# Patient Record
Sex: FEMALE | Race: WHITE | Employment: FULL TIME | URBAN - NONMETROPOLITAN AREA
[De-identification: names, ages, dates, MRNs, and addresses within clinical notes are randomized per-mention and may not be internally consistent; named-entity substitution may affect disease eponyms.]

---

## 2018-09-13 ENCOUNTER — HOSPITAL ENCOUNTER (EMERGENCY)
Age: 28
Discharge: HOME | End: 2018-09-13
Payer: COMMERCIAL

## 2018-09-13 VITALS
WEIGHT: 293 LBS | HEART RATE: 92 BPM | RESPIRATION RATE: 18 BRPM | DIASTOLIC BLOOD PRESSURE: 74 MMHG | TEMPERATURE: 98.4 F | OXYGEN SATURATION: 100 % | SYSTOLIC BLOOD PRESSURE: 133 MMHG

## 2018-09-13 DIAGNOSIS — J02.0 ACUTE STREPTOCOCCAL PHARYNGITIS: Primary | ICD-10-CM

## 2018-09-13 DIAGNOSIS — J20.9 ACUTE BRONCHITIS WITH BRONCHOSPASM: ICD-10-CM

## 2018-09-13 LAB
GROUP A STREP CULTURE, REFLEX: POSITIVE
REFLEX THROAT C + S: NORMAL

## 2018-09-13 PROCEDURE — 94640 AIRWAY INHALATION TREATMENT: CPT

## 2018-09-13 PROCEDURE — 2709999900 HC NON-CHARGEABLE SUPPLY

## 2018-09-13 PROCEDURE — 99203 OFFICE O/P NEW LOW 30 MIN: CPT | Performed by: NURSE PRACTITIONER

## 2018-09-13 PROCEDURE — 6370000000 HC RX 637 (ALT 250 FOR IP): Performed by: NURSE PRACTITIONER

## 2018-09-13 PROCEDURE — 99203 OFFICE O/P NEW LOW 30 MIN: CPT

## 2018-09-13 RX ORDER — IPRATROPIUM BROMIDE AND ALBUTEROL SULFATE 2.5; .5 MG/3ML; MG/3ML
1 SOLUTION RESPIRATORY (INHALATION) ONCE
Status: COMPLETED | OUTPATIENT
Start: 2018-09-13 | End: 2018-09-13

## 2018-09-13 RX ORDER — ALBUTEROL SULFATE 90 UG/1
2 AEROSOL, METERED RESPIRATORY (INHALATION) EVERY 4 HOURS PRN
Qty: 1 INHALER | Refills: 0 | Status: SHIPPED | OUTPATIENT
Start: 2018-09-13

## 2018-09-13 RX ORDER — DEXTROMETHORPHAN POLISTIREX 30 MG/5ML
60 SUSPENSION ORAL 2 TIMES DAILY PRN
Refills: 0 | COMMUNITY
Start: 2018-09-13 | End: 2018-09-23

## 2018-09-13 RX ORDER — AZITHROMYCIN 250 MG/1
TABLET, FILM COATED ORAL
Qty: 6 TABLET | Refills: 0 | Status: SHIPPED | OUTPATIENT
Start: 2018-09-13

## 2018-09-13 RX ORDER — PREDNISONE 20 MG/1
20 TABLET ORAL 2 TIMES DAILY
Qty: 10 TABLET | Refills: 0 | Status: SHIPPED | OUTPATIENT
Start: 2018-09-13 | End: 2018-09-18

## 2018-09-13 RX ADMIN — IPRATROPIUM BROMIDE AND ALBUTEROL SULFATE 1 AMPULE: .5; 3 SOLUTION RESPIRATORY (INHALATION) at 13:25

## 2018-09-13 ASSESSMENT — ENCOUNTER SYMPTOMS
RHINORRHEA: 1
ABDOMINAL PAIN: 0
SINUS PAIN: 0
NAUSEA: 0
VOMITING: 0
COUGH: 1
TROUBLE SWALLOWING: 0
SWOLLEN GLANDS: 0
SINUS PRESSURE: 0
SORE THROAT: 1
STRIDOR: 0
WHEEZING: 1
CHEST TIGHTNESS: 1
FACIAL SWELLING: 0
VOICE CHANGE: 0
SHORTNESS OF BREATH: 1

## 2018-09-13 NOTE — ED PROVIDER NOTES
rhinorrhea and sore throat. Negative for drooling, ear discharge, ear pain, facial swelling, postnasal drip, sinus pain, sinus pressure, sneezing, trouble swallowing and voice change. Respiratory: Positive for cough, chest tightness, shortness of breath and wheezing. Negative for stridor. Cardiovascular: Negative for chest pain, palpitations and leg swelling. Gastrointestinal: Negative for abdominal pain, nausea and vomiting. Musculoskeletal: Negative for arthralgias, myalgias and neck pain. Skin: Negative for pallor and rash. Neurological: Negative for dizziness and headaches. Hematological: Negative for adenopathy. PAST MEDICAL HISTORY   History reviewed. No pertinent past medical history. SURGICAL HISTORY     Patient  has no past surgical history on file. CURRENT MEDICATIONS       Discharge Medication List as of 9/13/2018  1:52 PM      CONTINUE these medications which have NOT CHANGED    Details   Norgestim-Eth Estrad Triphasic (ORTHO TRI-CYCLEN, 28, PO) Take by mouthHistorical Med             ALLERGIES     Patient is is allergic to cephalosporins. FAMILY HISTORY     Patient's family history is not on file. SOCIAL HISTORY     Patient  reports that she has never smoked. She has never used smokeless tobacco. She reports that she does not drink alcohol or use drugs. PHYSICAL EXAM     ED TRIAGE VITALS  BP: 133/74, Temp: 98.4 °F (36.9 °C), Pulse: 92, Resp: 18, SpO2: 100 %  Physical Exam   Constitutional: She is oriented to person, place, and time. Vital signs are normal. She appears well-developed and well-nourished. Non-toxic appearance. She does not have a sickly appearance. She does not appear ill. No distress. HENT:   Head: Normocephalic and atraumatic. Head is without right periorbital erythema and without left periorbital erythema.    Right Ear: Hearing, tympanic membrane, external ear and ear canal normal.   Left Ear: Hearing, tympanic membrane, external ear and ear canal normal.   Nose: Mucosal edema (bilateral ) present. Right sinus exhibits no maxillary sinus tenderness and no frontal sinus tenderness. Left sinus exhibits no maxillary sinus tenderness and no frontal sinus tenderness. Mouth/Throat: Uvula is midline and mucous membranes are normal. No trismus in the jaw. No uvula swelling. Posterior oropharyngeal erythema present. No oropharyngeal exudate, posterior oropharyngeal edema or tonsillar abscesses. Neck: Normal range of motion. Neck supple. Cardiovascular: Normal rate, regular rhythm, S1 normal, S2 normal and normal heart sounds. Exam reveals no gallop, no S3, no S4, no distant heart sounds and no friction rub. No murmur heard. Pulmonary/Chest: Effort normal and breath sounds normal. No accessory muscle usage or stridor. No respiratory distress. She has no decreased breath sounds. She has no wheezes. She has no rhonchi. She has no rales. Lymphadenopathy:     She has no cervical adenopathy. Neurological: She is alert and oriented to person, place, and time. Skin: Skin is warm, dry and intact. No rash noted. She is not diaphoretic. No cyanosis. No pallor. Nursing note and vitals reviewed. DIAGNOSTIC RESULTS   Labs:  Results for orders placed or performed during the hospital encounter of 09/13/18   Strep A culture, throat   Result Value Ref Range    REFLEX THROAT C + S NOT INDICATED    STREP A ANTIGEN   Result Value Ref Range    GROUP A STREP CULTURE, REFLEX POSITIVE (A)        IMAGING:    URGENT CARE COURSE:     Vitals:    09/13/18 1224 09/13/18 1337   BP: (!) 140/80 133/74   Pulse: 111 92   Resp: 14 18   Temp: 98.4 °F (36.9 °C)    TempSrc: Temporal    SpO2: 96% 100%   Weight: (!) 320 lb (145.2 kg)        Medications   ipratropium-albuterol (DUONEB) nebulizer solution 1 ampule (1 ampule Inhalation Given 9/13/18 1325)     PROCEDURES:  None  FINAL IMPRESSION      1. Acute streptococcal pharyngitis    2.  Acute bronchitis with bronchospasm DISPOSITION/PLAN   DISPOSITION Decision To Discharge 09/13/2018 01:48:51 PM  Strep A positive  Duoneb treatment given wheezing resolved, diminished in lower bases bilateral.  Air movement has improved. Afebrile, nontoxic in appearance. Patient's pulse ox improved to 100% upon discharge. She has hives to cephalosporins. We will start her on Z-Rashard. Did discuss with patient possibility of having walking pneumonia unable to do a chest x-ray at this time. Based on patient having strep A and improvement with DuoNeb    will discharge her home with outpatient therapy. Sudafed as directed for congestion  Delsym over counter for cough as needed  Prednisone as prescribed  Albuterol inhaler as directed  For any new or worsening symptoms patient is advised to go to the ER for treatment  PATIENT REFERRED TO:  Eusebio Roca., DO  [address] [place] [address] Rd  [phone]    Schedule an appointment as soon as possible for a visit in 4 days  For appointment     Patient instructed to follow up with PCP. If symptoms worsen, become severe or new symptoms develop patient instructed to go to the emergency room immediately.     DISCHARGE MEDICATIONS:  Discharge Medication List as of 9/13/2018  1:52 PM      START taking these medications    Details   albuterol sulfate  (90 Base) MCG/ACT inhaler Inhale 2 puffs into the lungs every 4 hours as needed for Wheezing or Shortness of Breath, Disp-1 Inhaler, R-0Normal      predniSONE (DELTASONE) 20 MG tablet Take 1 tablet by mouth 2 times daily for 5 days, Disp-10 tablet, R-0Normal      azithromycin (ZITHROMAX Z-RASHARD) 250 MG tablet 2 tablets day 1 then1 tablet days 2 - 5., Disp-6 tablet, R-0Normal      dextromethorphan (DELSYM) 30 MG/5ML extended release liquid Take 10 mLs by mouth 2 times daily as needed for Cough, R-0OTC      pseudoephedrine (SUDAFED 24 HOUR NON-DROWSY) 240 MG TB24 extended release tablet Take 1 tablet by mouth daily as needed (congestion) Daily for nasal congestion, Disp-20 tablet, R-0Normal           Discharge Medication List as of 9/13/2018  1:52 PM          Patient given educational materials - see patient instructions. Discussed use, benefit, and side effects of prescribed medications. All patient questions answered. Pt voiced understanding. Reviewed health maintenance. Patient agreed with treatment plan. Follow up as directed.      MICHELE Shah - MICHELE Kinsey CNP  09/13/18 5821